# Patient Record
Sex: FEMALE | Race: ASIAN | NOT HISPANIC OR LATINO | URBAN - METROPOLITAN AREA
[De-identification: names, ages, dates, MRNs, and addresses within clinical notes are randomized per-mention and may not be internally consistent; named-entity substitution may affect disease eponyms.]

---

## 2019-02-27 ENCOUNTER — OFFICE VISIT - RIVER FALLS (OUTPATIENT)
Dept: FAMILY MEDICINE | Facility: CLINIC | Age: 24
End: 2019-02-27

## 2019-02-27 ENCOUNTER — COMMUNICATION - RIVER FALLS (OUTPATIENT)
Dept: FAMILY MEDICINE | Facility: CLINIC | Age: 24
End: 2019-02-27

## 2019-02-27 LAB — DEPRECATED S PYO AG THROAT QL EIA: NOT DETECTED

## 2019-03-01 LAB — BACTERIA SPEC CULT: NORMAL

## 2022-02-12 VITALS
OXYGEN SATURATION: 99 % | SYSTOLIC BLOOD PRESSURE: 102 MMHG | TEMPERATURE: 98.6 F | DIASTOLIC BLOOD PRESSURE: 62 MMHG | WEIGHT: 164.08 LBS | HEART RATE: 64 BPM

## 2022-02-15 NOTE — NURSING NOTE
Depression Screening Entered On:  2/27/2019 1:25 PM CST    Performed On:  2/27/2019 1:24 PM CST by Santo Peguero CMA               Depression Screening   Feeling Down, Depressed, Hopeless :   Several days   Little Interest - Pleasure in Activities :   Nearly every day   Initial Depression Screen Score :   4    Trouble Falling or Staying Asleep :   Nearly every day   Feeling Tired or Little Energy :   More than half the days   Poor Appetite or Overeating :   Nearly every day   Feeling Bad About Yourself :   Not at all   Trouble Concentrating :   Nearly every day   Moving or Speaking Slowly :   Nearly every day   Thoughts Better Off Dead or Hurting Self :   Not at all   Detailed Depression Screen Score :   14    Total Depression Screen Score :   18    ZOHREH Difficulty with Work, Home, Others :   Very difficult   Santo Peguero CMA - 2/27/2019 1:24 PM CST

## 2022-02-15 NOTE — NURSING NOTE
CAGE Assessment Entered On:  2/27/2019 1:24 PM CST    Performed On:  2/27/2019 1:24 PM CST by Santo Peguero CMA               Assessment   Have you ever felt you should cut down on your drinking :   No   Have people annoyed you by criticizing your drinking :   No   Have you ever felt bad or guilty about your drinking :   No   Have you ever taken a drink first thing in the morning to steady your nerves or get rid of a hangover (Eye-opener) :   No   CAGE Score :   0    Santo Peguero CMA - 2/27/2019 1:24 PM CST

## 2022-02-15 NOTE — PROGRESS NOTES
Patient:   REESE FERRER SEE            MRN: [id number]            FIN: [id number]               Age:   23 years     Sex:  Female     :  1995   Associated Diagnoses:   Post-viral cough syndrome   Author:   Carlos Manuel Branch PA-C      Chief Complaint   2019 12:13 PM CST   Patient is here for sore throat. Started about two weeks ago. Was diagnosed with Bronchitis. c/o having to clear throat a lot.      History of Present Illness   Chief complaint and symptoms noted above and confirmed with patient   was seen in urgent care and was diagnosed with viral bronchitis, no abx was prescribed  feels like she needs to continuously clear throat  no OTC treatments used      Review of Systems   Constitutional:  No fever.    Ear/Nose/Mouth/Throat:  Sore throat.    Respiratory:  Cough.       Health Status   Allergies:    Allergic Reactions (Selected)  No Known Medication Allergies   Problem list:    No problem items selected or recorded.   Medications:  (Selected)   , not on any regular medications      Histories   Past Medical History:    No active or resolved past medical history items have been selected or recorded.   Family History:    No family history items have been selected or recorded.   Procedure history:    No active procedure history items have been selected or recorded.   Social History:             No active social history items have been recorded.      Physical Examination   Vital Signs   2019 12:13 PM CST Temperature Tympanic 98.6 DegF    Peripheral Pulse Rate 64 bpm    HR Method Electronic    Systolic Blood Pressure 102 mmHg    Diastolic Blood Pressure 62 mmHg    Mean Arterial Pressure 75 mmHg    BP Site Right arm    BP Method Manual    Oxygen Saturation 99 %      Measurements from flowsheet : Measurements   2019 12:13 PM CST   Weight Measured - Standard                164.08 lb     General:  No acute distress.    HENT:  Tympanic membranes are clear, No sinus tenderness, ooropharynx mildly enlarged  and inflamed without exudate, nares are patent.    Neck:  Supple, No lymphadenopathy, mild anterior cervical lymphadenopathy.    Respiratory:  Lungs are clear to auscultation.       Review / Management   Results review:       Interpretation: rapid strep is negative; culture pending, will call if abnormal results..       Impression and Plan   Diagnosis     Post-viral cough syndrome ([id number]).     Summary:  will treat with burst of prednisone and tessalon, advised to use mucinex or other expectorant, follow up if not improving.    Orders     Orders   Pharmacy:  Tessalon Perles 100 mg oral capsule (Prescribe): = 2 cap(s) ( 200 mg ), PO, TID, PRN: as needed for cough, # 30 cap(s), 0 Refill(s), Type: Maintenance, Pharmacy: [facility] [id number], 2 cap(s) Oral tid,PRN:as needed for cough  predniSONE 20 mg oral tablet (Prescribe): = 2 tab(s) ( 40 mg ), PO, Daily, x 5 day(s), Instructions: with food or milk, # 10 tab(s), 0 Refill(s), Type: Maintenance, Pharmacy: [facility] [id number], 2 tab(s) Oral daily,x5 day(s),Instr:with food or milk.     Orders   Charges (Evaluation and Management):  21614 office outpatient new 20 minutes (Charge) (Order): Quantity: 1, Post-viral cough syndrome  Sore throat.

## 2022-02-15 NOTE — NURSING NOTE
Comprehensive Intake Entered On:  2/27/2019 12:19 PM CST    Performed On:  2/27/2019 12:13 PM CST by Mariela Hill RN               Summary   Last Menstrual Period :   1/5/2019 CST   Menstrual Status :   Menarcheal   Santo Peguero CMA - 2/27/2019 1:27 PM CST   Chief Complaint :   Patient is here for sore throat. Started about two weeks ago. Was diagnosed with Bronchitis. c/o having to clear throat a lot.   Weight Measured :   164.08 lb(Converted to: 164 lb 1 oz, 74.43 kg)    Systolic Blood Pressure :   102 mmHg   Diastolic Blood Pressure :   62 mmHg   Mean Arterial Pressure :   75 mmHg   Peripheral Pulse Rate :   64 bpm   BP Site :   Right arm   BP Method :   Manual   HR Method :   Electronic   Temperature Tympanic :   98.6 DegF(Converted to: 37.0 DegC)    Oxygen Saturation :   99 %   Mariela Hill RN - 2/27/2019 12:13 PM CST   Health Status   Allergies Verified? :   Yes   Medication History Verified? :   Yes   Pre-Visit Planning Status :   Not completed   Tobacco Use? :   Never smoker   Mariela Hill RN - 2/27/2019 12:13 PM CST   Consents   Consent for Immunization Exchange :   Consent Granted   Consent for Immunizations to Providers :   Consent Granted   Mariela Hill RN - 2/27/2019 12:13 PM CST   Meds / Allergies   (As Of: 2/27/2019 12:19:55 PM CST)   Allergies (Active)   No Known Medication Allergies  Estimated Onset Date:   Unspecified ; Created By:   Mariela Hill RN; Reaction Status:   Active ; Category:   Drug ; Substance:   No Known Medication Allergies ; Type:   Allergy ; Updated By:   Mariela Hill RN; Reviewed Date:   2/27/2019 12:17 PM CST        Medication List   (As Of: 2/27/2019 12:19:55 PM CST)